# Patient Record
Sex: MALE | Race: WHITE | NOT HISPANIC OR LATINO | Employment: FULL TIME | URBAN - NONMETROPOLITAN AREA
[De-identification: names, ages, dates, MRNs, and addresses within clinical notes are randomized per-mention and may not be internally consistent; named-entity substitution may affect disease eponyms.]

---

## 2023-09-27 ENCOUNTER — HOSPITAL ENCOUNTER (EMERGENCY)
Facility: HOSPITAL | Age: 33
Discharge: HOME | End: 2023-09-27
Attending: EMERGENCY MEDICINE

## 2023-09-27 ENCOUNTER — APPOINTMENT (EMERGENCY)
Dept: CT IMAGING | Facility: HOSPITAL | Age: 33
End: 2023-09-27

## 2023-09-27 VITALS
TEMPERATURE: 97.3 F | OXYGEN SATURATION: 95 % | HEART RATE: 80 BPM | RESPIRATION RATE: 22 BRPM | WEIGHT: 238.1 LBS | SYSTOLIC BLOOD PRESSURE: 152 MMHG | DIASTOLIC BLOOD PRESSURE: 84 MMHG

## 2023-09-27 DIAGNOSIS — N20.1 URETEROLITHIASIS: Primary | ICD-10-CM

## 2023-09-27 LAB
ANION GAP SERPL CALCULATED.3IONS-SCNC: 10 MMOL/L
BACTERIA UR QL AUTO: ABNORMAL /HPF
BASOPHILS # BLD AUTO: 0.06 THOUSANDS/ÂΜL (ref 0–0.1)
BASOPHILS NFR BLD AUTO: 0 % (ref 0–1)
BILIRUB UR QL STRIP: NEGATIVE
BUN SERPL-MCNC: 11 MG/DL (ref 5–25)
CALCIUM SERPL-MCNC: 9.9 MG/DL (ref 8.4–10.2)
CHLORIDE SERPL-SCNC: 102 MMOL/L (ref 96–108)
CLARITY UR: ABNORMAL
CO2 SERPL-SCNC: 27 MMOL/L (ref 21–32)
COLOR UR: YELLOW
CREAT SERPL-MCNC: 0.92 MG/DL (ref 0.6–1.3)
EOSINOPHIL # BLD AUTO: 0.2 THOUSAND/ÂΜL (ref 0–0.61)
EOSINOPHIL NFR BLD AUTO: 1 % (ref 0–6)
ERYTHROCYTE [DISTWIDTH] IN BLOOD BY AUTOMATED COUNT: 12.3 % (ref 11.6–15.1)
GFR SERPL CREATININE-BSD FRML MDRD: 107 ML/MIN/1.73SQ M
GLUCOSE SERPL-MCNC: 131 MG/DL (ref 65–140)
GLUCOSE UR STRIP-MCNC: NEGATIVE MG/DL
HCT VFR BLD AUTO: 49.2 % (ref 36.5–49.3)
HGB BLD-MCNC: 16.8 G/DL (ref 12–17)
HGB UR QL STRIP.AUTO: ABNORMAL
IMM GRANULOCYTES # BLD AUTO: 0.06 THOUSAND/UL (ref 0–0.2)
IMM GRANULOCYTES NFR BLD AUTO: 0 % (ref 0–2)
KETONES UR STRIP-MCNC: NEGATIVE MG/DL
LEUKOCYTE ESTERASE UR QL STRIP: NEGATIVE
LYMPHOCYTES # BLD AUTO: 3.16 THOUSANDS/ÂΜL (ref 0.6–4.47)
LYMPHOCYTES NFR BLD AUTO: 21 % (ref 14–44)
MCH RBC QN AUTO: 31.1 PG (ref 26.8–34.3)
MCHC RBC AUTO-ENTMCNC: 34.1 G/DL (ref 31.4–37.4)
MCV RBC AUTO: 91 FL (ref 82–98)
MONOCYTES # BLD AUTO: 0.88 THOUSAND/ÂΜL (ref 0.17–1.22)
MONOCYTES NFR BLD AUTO: 6 % (ref 4–12)
MUCOUS THREADS UR QL AUTO: ABNORMAL
NEUTROPHILS # BLD AUTO: 10.94 THOUSANDS/ÂΜL (ref 1.85–7.62)
NEUTS SEG NFR BLD AUTO: 72 % (ref 43–75)
NITRITE UR QL STRIP: NEGATIVE
NON-SQ EPI CELLS URNS QL MICRO: ABNORMAL /HPF
NRBC BLD AUTO-RTO: 0 /100 WBCS
PH UR STRIP.AUTO: 6.5 [PH]
PLATELET # BLD AUTO: 369 THOUSANDS/UL (ref 149–390)
PMV BLD AUTO: 9.7 FL (ref 8.9–12.7)
POTASSIUM SERPL-SCNC: 3.8 MMOL/L (ref 3.5–5.3)
PROT UR STRIP-MCNC: ABNORMAL MG/DL
RBC # BLD AUTO: 5.4 MILLION/UL (ref 3.88–5.62)
RBC #/AREA URNS AUTO: ABNORMAL /HPF
SODIUM SERPL-SCNC: 139 MMOL/L (ref 135–147)
SP GR UR STRIP.AUTO: 1.02 (ref 1–1.03)
UROBILINOGEN UR QL STRIP.AUTO: 0.2 E.U./DL
WBC # BLD AUTO: 15.3 THOUSAND/UL (ref 4.31–10.16)
WBC #/AREA URNS AUTO: ABNORMAL /HPF

## 2023-09-27 PROCEDURE — 96375 TX/PRO/DX INJ NEW DRUG ADDON: CPT

## 2023-09-27 PROCEDURE — 96374 THER/PROPH/DIAG INJ IV PUSH: CPT

## 2023-09-27 PROCEDURE — 74176 CT ABD & PELVIS W/O CONTRAST: CPT

## 2023-09-27 PROCEDURE — 85025 COMPLETE CBC W/AUTO DIFF WBC: CPT | Performed by: EMERGENCY MEDICINE

## 2023-09-27 PROCEDURE — 80048 BASIC METABOLIC PNL TOTAL CA: CPT | Performed by: EMERGENCY MEDICINE

## 2023-09-27 PROCEDURE — 99285 EMERGENCY DEPT VISIT HI MDM: CPT | Performed by: EMERGENCY MEDICINE

## 2023-09-27 PROCEDURE — G1004 CDSM NDSC: HCPCS

## 2023-09-27 PROCEDURE — 36415 COLL VENOUS BLD VENIPUNCTURE: CPT | Performed by: EMERGENCY MEDICINE

## 2023-09-27 PROCEDURE — 81001 URINALYSIS AUTO W/SCOPE: CPT | Performed by: EMERGENCY MEDICINE

## 2023-09-27 PROCEDURE — 99284 EMERGENCY DEPT VISIT MOD MDM: CPT

## 2023-09-27 RX ORDER — ONDANSETRON 4 MG/1
4 TABLET, ORALLY DISINTEGRATING ORAL EVERY 6 HOURS PRN
Qty: 20 TABLET | Refills: 0 | Status: SHIPPED | OUTPATIENT
Start: 2023-09-27

## 2023-09-27 RX ORDER — ONDANSETRON 2 MG/ML
4 INJECTION INTRAMUSCULAR; INTRAVENOUS ONCE
Status: COMPLETED | OUTPATIENT
Start: 2023-09-27 | End: 2023-09-27

## 2023-09-27 RX ORDER — TAMSULOSIN HYDROCHLORIDE 0.4 MG/1
0.4 CAPSULE ORAL
Qty: 30 CAPSULE | Refills: 0 | Status: SHIPPED | OUTPATIENT
Start: 2023-09-27

## 2023-09-27 RX ORDER — TAMSULOSIN HYDROCHLORIDE 0.4 MG/1
0.4 CAPSULE ORAL ONCE
Status: COMPLETED | OUTPATIENT
Start: 2023-09-27 | End: 2023-09-27

## 2023-09-27 RX ORDER — MORPHINE SULFATE 10 MG/ML
5 INJECTION, SOLUTION INTRAMUSCULAR; INTRAVENOUS ONCE
Status: COMPLETED | OUTPATIENT
Start: 2023-09-27 | End: 2023-09-27

## 2023-09-27 RX ORDER — KETOROLAC TROMETHAMINE 30 MG/ML
10 INJECTION, SOLUTION INTRAMUSCULAR; INTRAVENOUS ONCE
Status: COMPLETED | OUTPATIENT
Start: 2023-09-27 | End: 2023-09-27

## 2023-09-27 RX ORDER — MORPHINE SULFATE 15 MG/1
7.5 TABLET ORAL EVERY 6 HOURS PRN
Qty: 5 TABLET | Refills: 0 | Status: SHIPPED | OUTPATIENT
Start: 2023-09-27

## 2023-09-27 RX ADMIN — TAMSULOSIN HYDROCHLORIDE 0.4 MG: 0.4 CAPSULE ORAL at 09:32

## 2023-09-27 RX ADMIN — MORPHINE SULFATE 5 MG: 10 INJECTION INTRAVENOUS at 09:32

## 2023-09-27 RX ADMIN — ONDANSETRON 4 MG: 2 INJECTION INTRAMUSCULAR; INTRAVENOUS at 08:44

## 2023-09-27 RX ADMIN — KETOROLAC TROMETHAMINE 9.9 MG: 30 INJECTION INTRAMUSCULAR; INTRAVENOUS at 08:43

## 2023-09-27 NOTE — ED PROVIDER NOTES
History  Chief Complaint   Patient presents with   • Flank Pain     Pt c/o left sided flank pain that radiates to the front starting last night. This is a 66-year-old male with the noted past medical hx who presents to the emergency department with rather severe left flank to left lower quadrant abdominal pain beginning abruptly yesterday afternoon at 1400 prior to going to work. Pain has occurred intermittently in episodic fashion, with pain beginning in the left CVA region and radiating into the left lower quadrant associated with nausea (when pain is most severe) although no episodes of vomiting. Otherwise in normal state of health until symptom onset. No prior history of similar symptoms. No history ureterolithiasis. No history of GI/ surgery. No associated fever/chills/hematuria/dysuria/urgency; he did experience significant urinary frequency yesterday evening but had not voided overnight until later this morning. Did not take or use anything for symptoms at home. Pain seems to be somewhat worse when he is lying supine. DDx includes but is not limited to: Ureterolithiasis, pyelonephritis, lower urinary tract infection, diverticulitis. Check CBC/BMP/UA and CT renal stone protocol. Symptomatic treatment while work-up ongoing. Disposition pending. History provided by:  Patient, medical records and relative  Flank Pain  Associated symptoms: nausea    Associated symptoms: no chills, no diarrhea, no dysuria, no fever, no hematuria and no vomiting        None       Past Medical History:   Diagnosis Date   • Anemia    • Asthma        History reviewed. No pertinent surgical history. History reviewed. No pertinent family history. I have reviewed and agree with the history as documented.     E-Cigarette/Vaping     E-Cigarette/Vaping Substances     Social History     Tobacco Use   • Smoking status: Never   • Smokeless tobacco: Never   Substance Use Topics   • Alcohol use: Never   • Drug use: Never       Review of Systems   Constitutional: Positive for diaphoresis. Negative for chills and fever. Respiratory: Negative. Cardiovascular: Negative. Gastrointestinal: Positive for abdominal distention, abdominal pain and nausea. Negative for diarrhea and vomiting. Genitourinary: Positive for flank pain and frequency. Negative for difficulty urinating, dysuria and hematuria. Skin: Negative. Neurological: Negative for syncope, weakness and light-headedness. Hematological: Negative. Physical Exam  Physical Exam  Vitals and nursing note reviewed. Constitutional:       General: He is awake. He is in acute distress (Moderate painful distress). Appearance: Normal appearance. He is well-developed. HENT:      Head: Normocephalic and atraumatic. Right Ear: Hearing and external ear normal.      Left Ear: Hearing and external ear normal.   Neck:      Trachea: Trachea and phonation normal.   Cardiovascular:      Rate and Rhythm: Normal rate and regular rhythm. Pulses:           Radial pulses are 2+ on the right side and 2+ on the left side. Dorsalis pedis pulses are 2+ on the right side and 2+ on the left side. Posterior tibial pulses are 2+ on the right side and 2+ on the left side. Heart sounds: Normal heart sounds, S1 normal and S2 normal. No murmur heard. No friction rub. No gallop. Pulmonary:      Effort: Pulmonary effort is normal. No respiratory distress. Breath sounds: Normal breath sounds. No stridor. No decreased breath sounds, wheezing, rhonchi or rales. Abdominal:      General: There is no distension. Palpations: There is no mass. Tenderness: There is abdominal tenderness in the left lower quadrant. There is left CVA tenderness. There is no right CVA tenderness, guarding or rebound. Skin:     General: Skin is warm and dry. Neurological:      Mental Status: He is alert and oriented to person, place, and time.       GCS: GCS eye subscore is 4. GCS verbal subscore is 5. GCS motor subscore is 6. Cranial Nerves: No cranial nerve deficit. Sensory: No sensory deficit. Motor: No abnormal muscle tone. Comments: PERRLA; EOMI. Sensation intact to light touch over face in V1-V3 distribution bilaterally. Facial expressions symmetric. Tongue/uvula midline. Shoulder shrug equal bilaterally. Strength 5/5 in UE/LE bilaterally. Sensation intact to light touch in UE/LE bilaterally.          Vital Signs  ED Triage Vitals [09/27/23 0827]   Temperature Pulse Respirations Blood Pressure SpO2   (!) 97.3 °F (36.3 °C) 72 22 (!) 203/106 97 %      Temp Source Heart Rate Source Patient Position - Orthostatic VS BP Location FiO2 (%)   Tympanic Monitor Sitting Left arm --      Pain Score       10 - Worst Possible Pain           Vitals:    09/27/23 0827 09/27/23 1027   BP: (!) 203/106 152/84   Pulse: 72 80   Patient Position - Orthostatic VS: Sitting Lying         Visual Acuity      ED Medications  Medications   ketorolac (TORADOL) injection 9.9 mg (9.9 mg Intravenous Given 9/27/23 0843)   ondansetron (ZOFRAN) injection 4 mg (4 mg Intravenous Given 9/27/23 0844)   tamsulosin (FLOMAX) capsule 0.4 mg (0.4 mg Oral Given 9/27/23 0932)   morphine injection 5 mg (5 mg Intravenous Given 9/27/23 0932)       Diagnostic Studies  Results Reviewed     Procedure Component Value Units Date/Time    Basic metabolic panel [[id number]] Collected: 09/27/23 0848    Lab Status: Final result Specimen: Blood from Arm, Right Updated: 09/27/23 5384     Sodium 139 mmol/L      Potassium 3.8 mmol/L      Chloride 102 mmol/L      CO2 27 mmol/L      ANION GAP 10 mmol/L      BUN 11 mg/dL      Creatinine 0.92 mg/dL      Glucose 131 mg/dL      Calcium 9.9 mg/dL      eGFR 107 ml/min/1.73sq m     Narrative:      [place] guidelines for Chronic Kidney Disease (CKD):   •  Stage 1 with normal or high GFR (GFR > 90 mL/min/1.73 square meters)  •  Stage 2 Mild CKD (GFR = 60-89 mL/min/1.73 square meters)  •  Stage 3A Moderate CKD (GFR = 45-59 mL/min/1.73 square meters)  •  Stage 3B Moderate CKD (GFR = 30-44 mL/min/1.73 square meters)  •  Stage 4 Severe CKD (GFR = 15-29 mL/min/1.73 square meters)  •  Stage 5 End Stage CKD (GFR <15 mL/min/1.73 square meters)  Note: GFR calculation is accurate only with a steady state creatinine    Urine Microscopic [[id number]]  (Abnormal) Collected: 09/27/23 0848    Lab Status: Final result Specimen: Urine, Clean Catch Updated: 09/27/23 0911     RBC, UA 10-20 /hpf      WBC, UA 1-2 /hpf      Epithelial Cells None Seen /hpf      Bacteria, UA None Seen /hpf      MUCUS THREADS Occasional    UA w Reflex to Microscopic w Reflex to Culture [[id number]]  (Abnormal) Collected: 09/27/23 0848    Lab Status: Final result Specimen: Urine, Clean Catch Updated: 09/27/23 0902     Color, UA Yellow     Clarity, UA Slightly Cloudy     Specific Gravity, UA 1.025     pH, UA 6.5     Leukocytes, UA Negative     Nitrite, UA Negative     Protein, UA 30 (1+) mg/dl      Glucose, UA Negative mg/dl      Ketones, UA Negative mg/dl      Urobilinogen, UA 0.2 E.U./dl      Bilirubin, UA Negative     Occult Blood, UA Large    CBC and differential [[id number]]  (Abnormal) Collected: 09/27/23 0848    Lab Status: Final result Specimen: Blood from Arm, Right Updated: 09/27/23 0857     WBC 15.30 Thousand/uL      RBC 5.40 Million/uL      Hemoglobin 16.8 g/dL      Hematocrit 49.2 %      MCV 91 fL      MCH 31.1 pg      MCHC 34.1 g/dL      RDW 12.3 %      MPV 9.7 fL      Platelets 951 Thousands/uL      nRBC 0 /100 WBCs      Neutrophils Relative 72 %      Immat GRANS % 0 %      Lymphocytes Relative 21 %      Monocytes Relative 6 %      Eosinophils Relative 1 %      Basophils Relative 0 %      Neutrophils Absolute 10.94 Thousands/µL      Immature Grans Absolute 0.06 Thousand/uL      Lymphocytes Absolute 3.16 Thousands/µL      Monocytes Absolute 0.88 Thousand/µL      Eosinophils Absolute 0.20 Thousand/µL      Basophils Absolute 0.06 Thousands/µL                  CT renal stone study abdomen pelvis wo contrast   Final Result by Lizbeth Thompson MD (09/27 1968)      Small left ureterovesical junction stone. Mild left hydronephrosis and mild secondary evidence of ureteral inflammation. Other nonemergent findings above. Workstation performed: [id number]                    Procedures  Procedures         ED Course  ED Course as of 09/27/23 1053   Wed Sep 27, 2023   0904 CBC and differential(!)  There is a leukocytosis, possibly reactive in the setting versus infectious. Hemoglobin/hematocrit normal.  Platelets normal.   5983 UA w Reflex to Microscopic w Reflex to Culture(!)  1+ protein plus large blood. Otherwise moderately concentrated   0906 CT completed and awaiting interpretation   0911 CT renal stone study abdomen pelvis wo contrast  FINDINGS:     RIGHT KIDNEY AND URETER:  No urinary tract calculi. No hydronephrosis or hydroureter. Normal appearance.     LEFT KIDNEY AND URETER:  0.2 cm ureterovesical junction stone. Mild hydroureteronephrosis and periureteral fatty stranding.     Left kidney mildly larger than right. No focal findings or perinephric fluid     URINARY BLADDER:  Under distended. No acute pathology.     No significant abnormality in the visualized lung bases.     Limited low dose CT shows no acute abnormality of the liver, spleen, pancreas or adrenal glands. Findings of hepatic steatosis. Gallbladder and bile ducts are within normal limits. No abnormal fluid, air or enlarged lymph nodes. Bowel loops appear unremarkable. Appendix is within normal limits. prostate and seminal vesicles within normal limits.     No acute osseous abnormalities. Vessels poorly evaluated without intravenous contrast.  Normal aortic caliber.   Regional soft tissues are within normal limits.     The study was marked in EPIC for immediate notification.     IMPRESSION:     Small left ureterovesical junction stone. Mild left hydronephrosis and mild secondary evidence of ureteral inflammation.     Other nonemergent findings above.      0911 Left-sided ureterolithiasis identified on CT consistent with patient's symptoms. Awaiting remainder of lab results and re-evaluation   0920 Urine Microscopic(!)   7896 Basic metabolic panel  Normal electrolytes and renal function   0922 Reviewed findings on CT with patient and family. Size and position of stone highly favorable to spontaneous passage. He still is experiencing significant pain at this point. Will administer tamsulosin and additional analgesia   1031 Significant improvement in pain with no nausea or vomiting. Reviewed results of work-up with patient and his father. Advised maintaining thorough hydration and avoidance of iced tea (which he drinks copiously) due to concern for oxalate stones. Will prescribe ondansetron and tamsulosin as well as short course of opioid analgesic for breakthrough pain; recommended use of naproxen as primary analgesic. I do not see any evidence of infection to warrant antibiotic therapy. Will place ambulatory referral to urology as well. Can return to the emergency department if symptoms worsening or uncontrolled. 1338 Naomi Duvall queried prior to Rx. Based on review of records, there is no evidence of controlled substance abuse or repeated inappropriate ED visits to obtain controlled substances. It is therefore reasonable to prescribe a short course of opiate-based analgesic for symptom control with close f/u to PMD also advised. Precautions given to avoid use of opiate medications while driving and to abstain from alcohol while using. All questions were answered to the satisfaction of patient prior to discharge. He expressed understanding and agreed to plan. SBIRT 20yo+    Flowsheet Row Most Recent Value   Initial Alcohol Screen: US AUDIT-C     1. How often do you have a drink containing alcohol? 0 Filed at: 09/27/2023 0827   2. How many drinks containing alcohol do you have on a typical day you are drinking? 0 Filed at: 09/27/2023 0827   3a. Male UNDER 65: How often do you have five or more drinks on one occasion? 0 Filed at: 09/27/2023 0827   3b. FEMALE Any Age, or MALE 65+: How often do you have 4 or more drinks on one occassion? 0 Filed at: 09/27/2023 0827   Audit-C Score 0 Filed at: 09/27/2023 8603                    MDM    Disposition  Final diagnoses:   Ureterolithiasis on the left side     Time reflects when diagnosis was documented in both MDM as applicable and the Disposition within this note     Time User Action Codes Description Comment    9/27/2023 10:36 AM Yuko Hutchins Add [N20.1] Ureterolithiasis     9/27/2023 10:36 AM Yuko Hutchins Modify [N20.1] Ureterolithiasis on the left side       ED Disposition     ED Disposition   Discharge    Condition   Stable    Date/Time   Wed Sep 27, 2023 10:36 AM    Comment   Tk [place]regon discharge to home/self care.                Follow-up Information     Follow up With Specialties Details Why Contact Info Additional [address] [place] [address] For Urology [facility]   [address] [id number]  [address] [place] [address] [facility], [address] [place] [address], [address] [facility] For Urology [facility]   [facility] [address]  [facility] [id number]  [address] [place] [address] For Urology [facility], [address], [place], [address]   [facility] For Urology [facility]   [address] [facility] [address] [place] [id number] [phone] [facility] For Urology [facility], [address] [place] [address], [address] [place] [address] Jasvir          Patient's Medications   Discharge Prescriptions    MORPHINE (MSIR) 15 MG TABLET    Take 0.5 tablets (7.5 mg total) by mouth every 6 (six) hours as needed for severe pain (Do not drive or drink alcohol while using) Max Daily Amount: 30 mg       Start Date: 9/27/2023 End Date: --       Order Dose: 7.5 mg       Quantity: 5 tablet    Refills: 0    ONDANSETRON (ZOFRAN ODT) 4 MG DISINTEGRATING TABLET    Take 1 tablet (4 mg total) by mouth every 6 (six) hours as needed for nausea or vomiting       Start Date: 9/27/2023 End Date: --       Order Dose: 4 mg       Quantity: 20 tablet    Refills: 0    TAMSULOSIN (FLOMAX) 0.4 MG    Take 1 capsule (0.4 mg total) by mouth daily with dinner       Start Date: 9/27/2023 End Date: --       Order Dose: 0.4 mg       Quantity: 30 capsule    Refills: 0       No discharge procedures on file.     PDMP Review     None          ED Provider  Electronically Signed by           Jessica Howell DO  09/27/23 9284

## 2024-03-19 ENCOUNTER — OFFICE VISIT (OUTPATIENT)
Dept: FAMILY MEDICINE CLINIC | Facility: CLINIC | Age: 34
End: 2024-03-19
Payer: COMMERCIAL

## 2024-03-19 VITALS
HEIGHT: 74 IN | OXYGEN SATURATION: 98 % | HEART RATE: 93 BPM | TEMPERATURE: 97.8 F | WEIGHT: 241.4 LBS | SYSTOLIC BLOOD PRESSURE: 160 MMHG | DIASTOLIC BLOOD PRESSURE: 94 MMHG | BODY MASS INDEX: 30.98 KG/M2

## 2024-03-19 DIAGNOSIS — Z13.9 SCREENING FOR UNSPECIFIED CONDITION: ICD-10-CM

## 2024-03-19 DIAGNOSIS — Z86.39 HISTORY OF IRON DEFICIENCY: ICD-10-CM

## 2024-03-19 DIAGNOSIS — K04.7 DENTAL INFECTION: ICD-10-CM

## 2024-03-19 DIAGNOSIS — M76.62 ACHILLES TENDINITIS OF LEFT LOWER EXTREMITY: ICD-10-CM

## 2024-03-19 DIAGNOSIS — M79.672 PAIN OF LEFT HEEL: ICD-10-CM

## 2024-03-19 DIAGNOSIS — Z13.220 SCREENING FOR HYPERLIPIDEMIA: ICD-10-CM

## 2024-03-19 DIAGNOSIS — G56.03 BILATERAL CARPAL TUNNEL SYNDROME: Primary | ICD-10-CM

## 2024-03-19 DIAGNOSIS — I10 ESSENTIAL HYPERTENSION: ICD-10-CM

## 2024-03-19 PROCEDURE — 99204 OFFICE O/P NEW MOD 45 MIN: CPT | Performed by: PHYSICIAN ASSISTANT

## 2024-03-19 RX ORDER — LOSARTAN POTASSIUM 50 MG/1
50 TABLET ORAL DAILY
Qty: 30 TABLET | Refills: 1 | Status: SHIPPED | OUTPATIENT
Start: 2024-03-19

## 2024-03-19 RX ORDER — AMOXICILLIN 500 MG/1
500 CAPSULE ORAL EVERY 8 HOURS SCHEDULED
Qty: 30 CAPSULE | Refills: 0 | Status: SHIPPED | OUTPATIENT
Start: 2024-03-19 | End: 2024-03-29

## 2024-03-19 NOTE — ASSESSMENT & PLAN NOTE
Blood pressure is elevated.  It has been elevated in the past.  Will start on losartan 50 mg daily.  We discussed changing diet and increasing physical activity.  Follow-up in 1 month or sooner if needed

## 2024-03-19 NOTE — ASSESSMENT & PLAN NOTE
Recommended stretching, ice, and ibuprofen.  Will get x-ray to rule out any bony abnormalities in heel.  If no relief with conservative treatment, will refer to podiatry.

## 2024-03-19 NOTE — PROGRESS NOTES
Name: Manoj Burnham      : 1990      MRN: [id number]  Encounter Provider: Katelin Morillo PA-C  Encounter Date: 3/19/2024   Encounter department: [facility]    Assessment & Plan     1. Bilateral carpal tunnel syndrome  Assessment & Plan:  Recommended that he wear carpal tunnel wrist splints at night.  Recommended stretching and ibuprofen.  Will place referral to orthopedic hand surgeon    Orders:  -     Ambulatory Referral to Orthopedic Surgery; Future    2. Achilles tendinitis of left lower extremity  Assessment & Plan:  Recommended stretching, ice, and ibuprofen.  Will get x-ray to rule out any bony abnormalities in heel.  If no relief with conservative treatment, will refer to podiatry.      3. Essential hypertension  Assessment & Plan:  Blood pressure is elevated.  It has been elevated in the past.  Will start on losartan 50 mg daily.  We discussed changing diet and increasing physical activity.  Follow-up in 1 month or sooner if needed    Orders:  -     losartan (COZAAR) 50 mg tablet; Take 1 tablet (50 mg total) by mouth daily    4. History of iron deficiency  -     Comprehensive metabolic panel; Future  -     Iron Panel (Includes Ferritin, Iron Sat%, Iron, and TIBC); Future    5. Screening for hyperlipidemia  -     Lipid panel; Future    6. Screening for unspecified condition  -     Comprehensive metabolic panel; Future  -     CBC and differential; Future  -     Lipid panel; Future  -     Iron Panel (Includes Ferritin, Iron Sat%, Iron, and TIBC); Future    7. Pain of left heel  Assessment & Plan:  Recommended stretching, ice, and ibuprofen.  Will get x-ray to rule out any bony abnormalities in heel.  If no relief with conservative treatment, will refer to podiatry.    Orders:  -     XR foot 3+ vw left; Future; Expected date: 2024    8. Dental infection  Assessment & Plan:  Prescription for amoxicillin.  Recommended that patient schedule a follow-up with a dentist.  I provided him with the  phone number for [facility] dental clinic since he does not have dental coverage.    Orders:  -     amoxicillin (AMOXIL) 500 mg capsule; Take 1 capsule (500 mg total) by mouth every 8 (eight) hours for 10 days           Corky Aranda is a pleasant 33-year-old male who is here today to establish care.  He has not been to a doctor in many years.  He does have a history of iron deficiency as a child.  He required transfusions.  He admits that this issue resolved on its own.  He had an extensive workup as a child for gastrointestinal cause of iron deficiency, but nothing was found.  He denies any black stools, bloody stools, or tarry stools.  He admits to extreme fatigue that he experienced as a child, never returned.  He does have a history of elevated blood pressure readings.  He has never been treated for hypertension.  He is also complaining of numbness and tingling in bilateral hands.  It affects his thumb, index, and middle fingers.  He admits that it is worse at night.  He admits that his symptoms do wake him up in the middle of the night.  He admits that he also finds himself dropping things easily.  He works as a , and admits that his job makes his symptoms worse.  He denies any recent injuries.  He is also complaining of left heel pain.  He admits that this is a chronic issue.  He does take Tylenol and ibuprofen, which do provide some relief.  He denies any injuries to his heel.  He has noticed a bulge at the insertion of his Achilles tendon.      Review of Systems   Constitutional:  Negative for chills, diaphoresis, fatigue and fever.   HENT:  Negative for congestion, ear pain, postnasal drip, rhinorrhea, sneezing, sore throat and trouble swallowing.    Eyes:  Negative for pain and visual disturbance.   Respiratory:  Negative for apnea, cough, shortness of breath and wheezing.    Cardiovascular:  Negative for chest pain and palpitations.   Gastrointestinal:  Negative for  "abdominal pain, constipation, diarrhea, nausea and vomiting.   Genitourinary:  Negative for dysuria.   Musculoskeletal:  Positive for arthralgias. Negative for gait problem and myalgias.        Left heel pain  Bilateral hand pain   Neurological:  Positive for numbness (Bilateral hands). Negative for dizziness, syncope, weakness, light-headedness and headaches.   Psychiatric/Behavioral:  Negative for suicidal ideas. The patient is not nervous/anxious.        Current Outpatient Medications on File Prior to Visit   Medication Sig   • [DISCONTINUED] morphine (MSIR) 15 mg tablet Take 0.5 tablets (7.5 mg total) by mouth every 6 (six) hours as needed for severe pain (Do not drive or drink alcohol while using) Max Daily Amount: 30 mg (Patient not taking: Reported on 3/19/2024)   • [DISCONTINUED] ondansetron (Zofran ODT) 4 mg disintegrating tablet Take 1 tablet (4 mg total) by mouth every 6 (six) hours as needed for nausea or vomiting (Patient not taking: Reported on 3/19/2024)   • [DISCONTINUED] tamsulosin (FLOMAX) 0.4 mg Take 1 capsule (0.4 mg total) by mouth daily with dinner (Patient not taking: Reported on 3/19/2024)       Objective     /94   Pulse 93   Temp 97.8 °F (36.6 °C)   Ht 6' 2\" (1.88 m)   Wt 109 kg (241 lb 6.4 oz)   SpO2 98%   BMI 30.99 kg/m²     Physical Exam  Vitals and nursing note reviewed.   Constitutional:       Appearance: He is well-developed. He is obese.   HENT:      Head: Normocephalic and atraumatic.      Right Ear: Tympanic membrane, ear canal and external ear normal.      Left Ear: Tympanic membrane, ear canal and external ear normal.      Nose: Nose normal.      Mouth/Throat:      Pharynx: No oropharyngeal exudate or posterior oropharyngeal erythema.   Eyes:      Pupils: Pupils are equal, round, and reactive to light.   Cardiovascular:      Rate and Rhythm: Normal rate and regular rhythm.      Heart sounds: Normal heart sounds. No murmur heard.     No friction rub. No gallop. "   Pulmonary:      Effort: Pulmonary effort is normal. No respiratory distress.      Breath sounds: Normal breath sounds. No wheezing or rales.   Musculoskeletal:         General: Normal range of motion.      Cervical back: Normal range of motion and neck supple.      Comments: Negative Tinel sign of bilateral wrists.  Positive Phalen sign.    Palpable bulge at insertion of Achilles tendon on left foot.  Mildly tender to palpation.  Full range of motion of left foot and ankle.  No erythema.   Lymphadenopathy:      Cervical: No cervical adenopathy.   Skin:     General: Skin is warm and dry.   Neurological:      Mental Status: He is alert and oriented to person, place, and time.   Psychiatric:         Behavior: Behavior normal.         Thought Content: Thought content normal.         Judgment: Judgment normal.       Katelin Morillo PA-C

## 2024-03-19 NOTE — ASSESSMENT & PLAN NOTE
Recommended that he wear carpal tunnel wrist splints at night.  Recommended stretching and ibuprofen.  Will place referral to orthopedic hand surgeon

## 2024-03-19 NOTE — ASSESSMENT & PLAN NOTE
Prescription for amoxicillin.  Recommended that patient schedule a follow-up with a dentist.  I provided him with the phone number for [facility] dental clinic since he does not have dental coverage.

## 2024-03-19 NOTE — PATIENT INSTRUCTIONS
Achilles Tendinitis   WHAT YOU NEED TO KNOW:   What is Achilles tendinitis?  Achilles tendinitis is swelling of the tendon that connects your calf muscle to your heel bone. It may happen suddenly or become a chronic condition. Your risk for Achilles tendinitis increases as you age.   What causes Achilles tendinitis?   Overuse of your leg muscles    A sudden increase in the amount or intensity of an activity    Jumping or running on hard or uneven surfaces    Wearing shoes that do not fit or support your foot and ankle    Tight calf muscles    A bone spur where your Achilles tendon attaches to your heel    Medicines such as steroids or antibiotics    What are the signs and symptoms of Achilles tendinitis?   Pain in your heel that gets worse with activity    Swelling in your heel or calf    Stiffness in your heel or calf    How is Achilles tendinitis diagnosed?  Your healthcare provider will examine your heel and calf for swelling. Your provider may move your foot or ankle and ask if you have pain. Tell your provider when your symptoms started and which activities make the pain worse. You may need an x-ray, ultrasound, or MRI to check for bone spurs or tears in your Achilles tendon. Do not enter the MRI room with anything metal. Metal can cause serious injury. Tell the healthcare provider if you have any metal in or on your body.  How is Achilles tendinitis treated?   Medicines  may be given to decrease pain and swelling.    Support devices  may include a splint, orthotic, or brace. These devices will decrease pressure on your Achilles tendon and help relieve pain.    Physical therapy  may be needed. A physical therapist teaches you exercises to help improve movement and strength, and decrease pain. You may need to practice exercises at home.    Surgery and other procedures  may be needed if other treatments do not work. Surgery may be done to repair a tear in the tendon, or to remove parts of the tendon. Other  procedures may include an injection of platelets or red blood cells into your Achilles tendon. It may also include therapy with electrical currents to treat swelling and pain.    What can I do to manage Achilles tendinitis?   Rest  as directed. Rest decreases swelling and prevents your tendinitis from getting worse. Your healthcare provider may tell you to stop your usual training or exercise activities. Ask when you can return to your normal activities or exercise plan.    Apply ice  on your Achilles tendon for 15 to 20 minutes every hour or as directed. Use an ice pack, or put crushed ice in a plastic bag. Cover it with a towel. Ice helps prevent tissue damage and decreases swelling and pain.    Wear a compression bandage or use tape  as directed. This will decrease swelling and pain. Ask your healthcare provider how to wrap a compression bandage or apply tape. If you use a support device ask if you should wear a compression bandage or use tape.    Elevate  your heel above the level of your heart as often as you can. This will help decrease swelling and pain. Prop your heel on pillows or blankets to keep it elevated comfortably.    Stretch  as directed when you return to your exercise program. Always warm up your muscles and stretch before you exercise. Do cool down exercises and stretches when you are finished. This will keep your muscles loose and decrease stress on your Achilles tendon.    Do bilateral heel drop exercises as directed.  Bilateral heel drops strengthen your Achilles tendon. Do not do the following exercise unless your healthcare provider says it is safe:    Stand at the edge of a stair or raised step. Hold onto the railing for balance.    Place the front part of your foot on the stair or step. Let the back of your foot hang off of the stair or step.    Slowly lift your heels off the ground and then slowly lower your heels past the stair. Do not move your heels quickly.  This could make your  injury worse. Repeat this exercise 20 times or as directed.       Slowly increase the time and intensity when you return to your exercise program.  Start with short and low intensity exercises. Ask your healthcare provider how and when to increase the time and intensity of your exercise.    When should I contact my healthcare provider?   You have a fever.    Your swelling or pain gets worse.    You feel or hear a sudden pop near your ankle.    You cannot bend your ankle or put pressure on your leg.    You have questions about your condition or care.    CARE AGREEMENT:   You have the right to help plan your care. Learn about your health condition and how it may be treated. Discuss treatment options with your healthcare providers to decide what care you want to receive. You always have the right to refuse treatment. The above information is an  only. It is not intended as medical advice for individual conditions or treatments. Talk to your doctor, nurse or pharmacist before following any medical regimen to see if it is safe and effective for you.  © Copyright [employer] 2023 Information is for End User's use only and may not be sold, redistributed or otherwise used for commercial purposes.    Ankle Exercises   AMBULATORY CARE:   What you need to know about ankle exercises:  Ankle exercises help strengthen your ankle and improve its function after injury. These are beginning exercises. Ask your healthcare provider if you need to see a physical therapist for more advanced exercises.   General guidelines for ankle exercises:   Do these exercises 3 to 5 days a week, or as directed by your healthcare provider.  Ask if you should do the exercises on each ankle.    Do the exercises in the order that your healthcare provider recommends.  This will help prevent swelling, chronic pain, and reinjury. Start with range of motion exercises. Then move to strengthening exercises, and finally to balancing  exercises.    Warm up before you do ankle exercises.  Walk or ride a stationary bike for 5 to 10 minutes to prepare your ankle for movement.    Stop if you feel pain.  It is normal to feel some discomfort at first but you should not feel pain. Tell your doctor or physical therapist if you have pain while you exercise. Regular exercise will help decrease your discomfort over time.    How to perform range of motion exercises safely:  Begin with range of motion exercises to improve flexibility. Ask your healthcare provider when you can progress to strengthening exercises.  Ankle alphabet:  Sit on a chair so that your feet do not touch the floor. Use your big toe to write each letter of the alphabet. Use only your foot and ankle, and keep your movements small. Do 2 sets.         Calf stretches:      Sitting calf stretches with a towel:  Sit on the floor with both legs out straight in front of you. Loop a towel around the ball of your injured foot. Grasp the ends of the towel and pull it toward you. Keep your leg and back straight. Do not lean forward as you pull the towel. Hold for 30 seconds. Then relax for 30 seconds. Do 2 sets of 10.         Standing calf stretches:  Stand facing a wall with the foot that is not injured forward and your knee slightly bent. Keep the leg with the injured foot straight and behind you with your toes pointed in slightly. With both heels flat on the floor, press your hips forward. Do not arch your back. Hold for 30 seconds, and then relax for 30 seconds. Do 2 sets of 10. Repeat with your leg bent. Do 2 sets of 10.       How to perform strengthening exercises safely:  After you can perform range of motion exercises without pain, you may begin strengthening exercises. Ask your healthcare provider when you can progress to balancing exercises.  Ankle movement in 4 directions:  Sit on the floor with your legs straight in front of you. Keep your heels on the floor for support.    Dorsiflexion:   Begin with your toes pointing straight up. Pull your toes toward your body. Slowly return to the starting position. Do 3 sets of 5.     Plantar flexion:  Begin with your toes pointing straight up. Push your toes away from your body. Slowly return to the starting position. Do 3 sets of 5.         Inversion:  Begin with your toes pointing straight up. Push your toes inward, toward each other. Slowly return to the starting position. Do 3 sets of 5.     Eversion:  Begin with your toes pointing straight up. Push your toes outward, away from each other. Slowly return to the starting position. Do 3 sets of 5.       Toe curls with a towel:  Sit on a chair so that both of your feet are flat on the floor. Place a small towel on the floor in front of your injured foot. Grab the center of the towel with your toes and curl the towel toward you. Relax and repeat. Do 1 set of 5.         [place] pick-ups:  Sit on a chair so that both of your feet are flat on the floor. Place 20 marbles on the floor in front of your injured foot. Use your toes to  one marble at a time and place it into a bowl. Repeat until you have picked up all the marbles. Do 1 set.     Heel raises:      Single leg heel raises:  Stand with your weight evenly on both feet. Hold on to a chair or a wall for balance. Lift the foot that is not injured off the floor so all your weight is placed on your injured foot. Raise the heel of your injured foot as high as you can. Slowly lower your heel to the floor. Do 1 set of 10.         Double leg heel raises:  Stand with your weight evenly on both feet. Hold on to a chair or a wall for balance. Raise both of your heels as high as you can. Slowly lower your heels to the floor. Do 1 set of 10.       Heel and toe walks:      Heel walks:  Begin in a standing position. Lift your toes off the floor and walk on your heels. Keep your toes lifted as high as possible. Do 2 sets of 10.         Toe walks:  Begin in a standing  position. Lift your heels off the floor and walk on the balls and toes of your feet. Keep your heels lifted as high as possible. Do 2 sets of 10.       How to perform a balance exercise safely:  After you can perform strengthening exercises without pain, you may do this beginning balancing exercise. Ask your healthcare provider for more advanced balance exercises.  Single leg stance:  Stand with your weight evenly on both feet, or hold on to a chair or a wall. Do not lean to the side. Lift the foot that is not injured off the floor so all your weight is placed on your injured foot. Balance on your injured foot. Ask your healthcare provider how long to hold this position.           Call your doctor or physical therapist if:   You have new pain, or your pain becomes worse.    You have questions or concerns about your condition, care, or exercise program.    © Copyright [employer] 2023 Information is for End User's use only and may not be sold, redistributed or otherwise used for commercial purposes.  The above information is an  only. It is not intended as medical advice for individual conditions or treatments. Talk to your doctor, nurse or pharmacist before following any medical regimen to see if it is safe and effective for you.    Carpal Tunnel Syndrome   AMBULATORY CARE:   Carpal tunnel syndrome (CTS)  is a condition that causes pressure to build in the carpal tunnel. The carpal tunnel is a small area between bones and tissues in your wrist. Swelling in this area puts pressure on the median nerve. The median nerve controls muscles and feeling in the hand.       Common signs and symptoms:   Dull, sharp, or shooting pain in your hand    Numb, tingling, or burning feeling in your thumb and first, middle, and ring fingers    Arm pain or tingling that may move up your arm toward your shoulder    Weakness in your hand    Swelling in your hand    Not being able to control how your hand moves, or not being  able to use your fingers easily    Seek care immediately if:   You suddenly lose feeling in your hand or fingers and you cannot move them.    Your hand suddenly changes color.    Call your doctor if:   Your symptoms get worse.    Your hand and fingers are so weak that you cannot grab, squeeze, or lift items.    You have questions or concerns about your condition or care.    Treatment  may not be needed. If your symptoms continue or are severe, you may need any of the following:  NSAIDs  help decrease swelling and pain or fever. This medicine is available with or without a doctor's order. NSAIDs can cause stomach bleeding or kidney problems in certain people. If you take blood thinner medicine, always ask your healthcare provider if NSAIDs are safe for you. Always read the medicine label and follow directions.    Steroid injections  may help decrease pain and swelling. Steroid medicine is injected into the carpal tunnel.    Transcutaneous electric nerve stimulation (TENS)  uses mild electrical impulses to help decrease your wrist pain.    Surgery  called decompression may be used to take pressure off of the median nerve in your wrist.    Manage your symptoms:   Apply ice to your wrist.  Ice helps decrease swelling and pain in your wrist. Ice may also help prevent tissue damage. Use an ice pack, or put crushed ice in a plastic bag. Cover the ice pack with a towel. Place it on your wrist for 15 to 20 minutes every hour, or as directed.    Rest your hands.  Let your hands rest for a short time between repetitive motions, such as typing. If you feel pain, stop what you are doing and gently massage your wrist and hand.    Go to physical and occupational therapy, if directed.  Physical therapists will show you ways to exercise and strengthen your wrist. Occupational therapists will show you safe ways to use your wrist while you do your usual activities.    Use a wrist splint as directed.  A splint will keep your wrist  straight or in a slightly bent position. A wrist splint decreases pressure on the median nerve by letting your wrist rest. You may need to wear the splint for up to 8 weeks. You may need to wear it at night.    Follow up with your doctor as directed:  Write down your questions so you remember to ask them during your visits.  © Copyright [employer] 2023 Information is for End User's use only and may not be sold, redistributed or otherwise used for commercial purposes.  The above information is an  only. It is not intended as medical advice for individual conditions or treatments. Talk to your doctor, nurse or pharmacist before following any medical regimen to see if it is safe and effective for you.

## 2024-05-16 DIAGNOSIS — I10 ESSENTIAL HYPERTENSION: ICD-10-CM

## 2024-05-17 RX ORDER — LOSARTAN POTASSIUM 50 MG/1
50 TABLET ORAL DAILY
Qty: 30 TABLET | Refills: 5 | Status: SHIPPED | OUTPATIENT
Start: 2024-05-17